# Patient Record
Sex: FEMALE | Race: BLACK OR AFRICAN AMERICAN | Employment: FULL TIME | URBAN - METROPOLITAN AREA
[De-identification: names, ages, dates, MRNs, and addresses within clinical notes are randomized per-mention and may not be internally consistent; named-entity substitution may affect disease eponyms.]

---

## 2022-06-29 ENCOUNTER — OFFICE VISIT (OUTPATIENT)
Dept: ORTHOPEDIC SURGERY | Age: 27
End: 2022-06-29
Payer: COMMERCIAL

## 2022-06-29 DIAGNOSIS — M79.2 NEURALGIA OF LEFT FOOT: ICD-10-CM

## 2022-06-29 DIAGNOSIS — M79.672 LEFT FOOT PAIN: Primary | ICD-10-CM

## 2022-06-29 PROCEDURE — L4360 PNEUMAT WALKING BOOT PRE CST: HCPCS | Performed by: ORTHOPAEDIC SURGERY

## 2022-06-29 PROCEDURE — 99204 OFFICE O/P NEW MOD 45 MIN: CPT | Performed by: ORTHOPAEDIC SURGERY

## 2022-06-29 RX ORDER — GABAPENTIN 100 MG/1
100 CAPSULE ORAL 3 TIMES DAILY
Qty: 90 CAPSULE | Refills: 2 | Status: SHIPPED | OUTPATIENT
Start: 2022-06-29 | End: 2022-07-29

## 2022-06-29 RX ORDER — PREDNISONE 20 MG/1
TABLET ORAL
COMMUNITY
Start: 2022-06-15

## 2022-06-29 NOTE — LETTER
[address] [address]  Phone: [phone]  Fax: [phone]    Angelito Luciano MD        June 29, 2022     Patient: Oliverio Queen   YOB: 1995   Date of Visit: 6/29/2022       To Whom It May Concern: It is my medical opinion that Oliverio Queen: Work status: Sitting work only with very limited standing walking in the 3D boot as long as she has no foot pain    If you have any questions or concerns, please don't hesitate to call.     Sincerely,        Angelito Luciano MD

## 2022-06-29 NOTE — PROGRESS NOTES
Name: Mitul Navas  YOB: 1995  Gender: female  MRN: [id number]    CC: Left foot pain    HPI:   06/14/2022: She reports having severe plantar left arch pain with no injury. She reports she has company issued shoes. 06/15/2022: [facility] treated left foot pain with Prednisone. 06/27/2022: [facility] reflects persistent left foot pain despite 06/15/2022 treatment with Prednisone. Her foot pain was noted to be worse . XR reported as no acute fracture or dislocation    ROS/Meds/PSH/PMH/FH/SH: reviewed today    Tobacco:  reports that she has never smoked. She has never used smokeless tobacco.     Physical Examination:  Patient appears to be alert and oriented with acceptable appearance. No obvious distress or SOB  CV: appears to have acceptable vascular color and capillary refill  Neuro:appears to have mostly intact light touch sensation   Skin: Left plantar arch soft tissue thickening; no redness; no iesha mass appreciated  MS: Standing: Pes planovalgus: Gait very protected left  Left = mid arch soft tissue related pain; no iesha plantar fascial tear; no heel pain; no dorsal foot pain  Left = appears to have good ankle/foot motion; 5/5 strength; no instability or crepitance    XR: Left side: Standing AP lateral oblique foot taken today with some changes in the third toe proximal phalanx; some degree of talonavicular narrowing and arthritis; pes planus  XR Impression:  As above      Reviewed Test/Records/Documents: [facility] records    Assessment:    Left plantar arch pain and soft tissue thickening of uncertain etiology    Plan:   The patient and I discussed the above assessment. We explored treatment options. I would like to try her in a boot and on Neurontin to see if her pain relates to neuralgia  Advanced medical imaging:  If no resolution: Left foot MRI scan with and without contrast    DME: Placed in 3D boot  We discussed foot care and protection  PT: No indication today for PT  Orthotic/prosthetic: Future

## 2022-06-29 NOTE — PROGRESS NOTES
The patient was prescribed a walker boot for the patient's left foot. The patient wears a size 9.5 shoe and I fitted them with a M size boot. The patient was fitted and instructed on the use of prescribed walker boot. I explained how to fit themselves and that the plastic flexible piece should always be on the front of the boot and secured by the Velcro straps on top. The air bladder in the boot was adjusted according to proper fit and comfort. The patient walked a short distance and acknowledged satisfaction with current fit. I also explained that they need a heel lift or a higher heeled shoe for the uninvolved LE to help normalize gait and avoid excessive low back stress/strain due to leg length inequality created from walker boot. Patient read and signed documenting they understand and agree to [facility]'s current DME return policy.

## 2022-07-13 ENCOUNTER — TELEPHONE (OUTPATIENT)
Dept: ORTHOPEDIC SURGERY | Age: 27
End: 2022-07-13